# Patient Record
Sex: MALE | Race: OTHER
[De-identification: names, ages, dates, MRNs, and addresses within clinical notes are randomized per-mention and may not be internally consistent; named-entity substitution may affect disease eponyms.]

---

## 2022-07-06 ENCOUNTER — HOSPITAL ENCOUNTER (EMERGENCY)
Dept: HOSPITAL 15 - ER | Age: 27
Discharge: HOME | End: 2022-07-06
Payer: COMMERCIAL

## 2022-07-06 VITALS — DIASTOLIC BLOOD PRESSURE: 81 MMHG | SYSTOLIC BLOOD PRESSURE: 133 MMHG

## 2022-07-06 VITALS — HEIGHT: 76 IN | BODY MASS INDEX: 26.79 KG/M2 | WEIGHT: 220 LBS

## 2022-07-06 DIAGNOSIS — Z77.21: Primary | ICD-10-CM

## 2022-07-06 PROCEDURE — 36415 COLL VENOUS BLD VENIPUNCTURE: CPT

## 2022-07-06 PROCEDURE — 86706 HEP B SURFACE ANTIBODY: CPT

## 2022-07-06 PROCEDURE — 86703 HIV-1/HIV-2 1 RESULT ANTBDY: CPT

## 2022-07-06 PROCEDURE — 86803 HEPATITIS C AB TEST: CPT

## 2022-07-06 PROCEDURE — 87340 HEPATITIS B SURFACE AG IA: CPT

## 2025-01-11 ENCOUNTER — HOSPITAL ENCOUNTER (EMERGENCY)
Dept: HOSPITAL 15 - ER | Age: 30
Discharge: HOME | End: 2025-01-11
Payer: COMMERCIAL

## 2025-01-11 VITALS — WEIGHT: 200.62 LBS | HEIGHT: 76 IN | BODY MASS INDEX: 24.43 KG/M2

## 2025-01-11 VITALS
DIASTOLIC BLOOD PRESSURE: 79 MMHG | RESPIRATION RATE: 20 BRPM | SYSTOLIC BLOOD PRESSURE: 118 MMHG | HEART RATE: 74 BPM | OXYGEN SATURATION: 100 %

## 2025-01-11 DIAGNOSIS — Z77.21: Primary | ICD-10-CM

## 2025-01-11 PROCEDURE — 86706 HEP B SURFACE ANTIBODY: CPT

## 2025-01-11 PROCEDURE — 86803 HEPATITIS C AB TEST: CPT

## 2025-01-11 PROCEDURE — 87340 HEPATITIS B SURFACE AG IA: CPT

## 2025-01-11 NOTE — ED.PDOC
History of Present Illness EXP


HPI Comments


29y M who presents to the ED for chief complaint of post-exposure. Pt states he 

was handed a bag of needles and states while trying to throw away the needles, 

he accidentally got pricked by a needle on a finger of his L hand. Pt otherwise 

denies any associated symptoms. Pt otherwise denies any other symptoms at this 

time.


Chief Complaint:  Post Exposure


Time Seen by MD:  21:16


Reviewed Notes:  Nurses Notes


Allergies:  


Coded Allergies:  


     NO KNOWN ALLERGIES (Unverified , 7/6/22)


Information Source:  Patient


Mode of Arrival:  Ambulatory


Brought in by:  


self





Past Medical History


PAST MEDICAL HISTORY:  Denies


Surgical History:  Denies all surgeries





Family History


Family History:  Reviewed,noncontributory to illness





Social History


Smoker:  Non-Smoker


Alcohol:  Denies ETOH Use


Drugs:  Denies Drug Use





Constitutional:  denies: chills, diaphoresis, fatigue, fever, malaise, sweats, 

weakness, others


EENTM:  denies: blurred vision, double vision, ear bleeding, ear discharge, ear 

drainage, ear pain, ear ringing, eye pain, eye redness, hearing loss, mouth 

pain, mouth swelling, nasal discharge, nose bleeding, nose congestion, nose 

pain, photophobia, tearing, throat pain, throat swelling, voice changes, others


Respiratory:  denies: cough, hemoptysis, orthopnea, SOB at rest, shortness of 

breath, SOB with excertion, stridor, wheezing, others


Cardiovascular:  denies: chest pain, dizzy spells, diaphoresis, Dyspnea on 

exertion, edema, irregular heart beat, left arm pain, lightheadedness, 

palpitations, PND, syncope, others


Gastrointestinal:  denies: abdomen distended, abdominal pain, blood streaked 

bowels, constipated, diarrhea, dysphagia, difficulty swallowing, hematemesis, 

melena, nausea, poor appetite, poor fluid intake, rectal bleeding, rectal pain, 

vomiting, others


Genitourinary:  denies: burning, dysuria, flank pain, frequency, hematuria, 

incontinence, penile discharge, penile sore, pain, testicle pain, testicle 

swelling, urgency, others


Neurological:  denies: dizziness, fainting, headache, left sided numbness, left 

sided weakness, numbness, paresthesia, pre-existing deficit, right sided 

numbness, right sided weakness, seizure, speech problems, tingling, tremors, 

weakness, others


Musculoskeletal:  denies: back pain, gout, joint pain, joint swelling, muscle 

pain, muscle stiffness, neck pain, others


Integumetry:  denies: bruises, change in color, change in hair/nails, dryness, 

laceration, lesions, lumps, rash, wounds, others


Allergic/Immunocompromised:  denies: Difficulty Healing, Frequent Infections, 

Hives, Itching, others


Hematologic/Lymphatic:  denies: anemia, blood clots, easy bleeding, easy 

bruising, swollen glands, others


Endocrine:  denies: excessive hunger, excessive sweating, excessive thirst, 

excessive urination, flushing, intolerance to cold, intolerance to heat, unex

plained weight gain, unexplained weight loss, others


Psychiatric:  denies: anxiety, bipolar disorder, depression, hopeless, panic 

disorder, schizophrenia, sleepless, suicidal, others


All Other Systems:  Reviewed and Negative





Physical Exam


General Appearance:  Moderate Distress


HEENT:  Normal ENT Inspection, Pharynx Normal, TMs Normal


Neck:  Full Range of Motion, Non-Tender, Normal, Normal Inspection


Respiratory:  Chest Non-Tender, Lungs Clear, No Accessory Muscle Use, No 

Respiratory Distress, Normal Breath Sounds


Cardiovascular:  No Edema, No JVD, No Murmur, No Gallop, Normal Peripheral 

Pulses, Regular Rate/Rhythm


Breast Exam:  Deferred


Gastrointestinal:  No Organomegaly, Non Tender, No Pulsatile Mass, Normal Bowel 

Sounds, Soft


Genitalia:  Deferred


Pelvic:  Deferred


Rectal:  Deferred


Extremities:  No calf tenderness, Normal capillary refill, Normal inspection, 

Normal range of motion, Non-tender, No pedal edema


Musculoskeletal :  


   Apperance:  Normal


Neurologic:  Alert, CNs II-XII nml as Tested, No Motor Deficits, Normal Affect, 

Normal Mood, No Sensory Deficits


Cerebellar Function:  Normal


Reflexes:  Normal


Skin:  Dry, Normal Color, Warm


Peripheral Pulses:  3+ Radial (R), 3+ Radial (L)


Lymphatic:  No Adenopathy





Was a procedure done?


Was a procedure done?:  No





Differential Diagnosis (EXP)


Differential Diagnosis:  Body fluid exposure, Infect. Disease exposure, Needle 

stick exposure





X-Ray, Labs, Meds, VS





                                   Vital Signs








  Date Time  Temp Pulse Resp B/P (MAP) Pulse Ox O2 Delivery O2 Flow Rate FiO2


 


1/11/25 21:12 97.9 74 20 118/79 (92) 100   








                                       Lab








Test


 1/11/25


21:30 Range/Units


 


 


Hepatitis B Surface Antigen Pending   


 


Hepatitis B Surface Antibody Pending   


 


Hepatitis C Antibody Pending   





Patient alert. 


Needlestick.


Vitals stable.  


Answering all questions.  


Unknown about the needles.  


No sign of any major injury.


Stuck in the finger.


No active bleeding.  


Explained to the patient.  


Was told to follow up his primary care physician.  


Was told to come back if there is any problem.


Time of 1ST Reevaluation:  21:45


Reevaluation 1ST:  Improved


Patient Education/Counseling:  Diagnosis, Treatment


Family Education/Counseling:  No Family Present





Departure 1


Departure


Time of Disposition:  21:48


Impression:  


   Primary Impression:  


   Exposure to body fluid


Disposition:  01 HOME / SELF CARE / HOMELESS


Condition:  Good


Discharged With:  Self





Critical Care Note


Critical Care Time?:  No





Stability


Stability form required:  No





Heart Score


Heart Score:  








Heart Score Response (Comments) Value


 


History N/A 0


 


EKG N/A 0


 


Age N/A 0


 


Risk Factors N/A 0


 


Troponin N/A 0


 


Total  0














I personally scribed for CORDELL AMEZQUITA MD ([email or web address]) on 1/11/25 at 21:18.  

Electronically submitted by Florentino Shultz ([email or web address]).





CORDELL AMEZQUITA MD           Jan 11, 2025 21:18